# Patient Record
Sex: FEMALE | Race: WHITE | NOT HISPANIC OR LATINO | Employment: PART TIME | URBAN - METROPOLITAN AREA
[De-identification: names, ages, dates, MRNs, and addresses within clinical notes are randomized per-mention and may not be internally consistent; named-entity substitution may affect disease eponyms.]

---

## 2017-02-20 ENCOUNTER — HOSPITAL ENCOUNTER (OUTPATIENT)
Dept: MAMMOGRAPHY | Facility: HOSPITAL | Age: 47
Discharge: HOME | End: 2017-02-20
Attending: FAMILY MEDICINE | Admitting: FAMILY MEDICINE

## 2017-03-07 ENCOUNTER — HOSPITAL ENCOUNTER (OUTPATIENT)
Dept: MAMMOGRAPHY | Facility: HOSPITAL | Age: 47
Discharge: HOME | End: 2017-03-07
Attending: FAMILY MEDICINE | Admitting: FAMILY MEDICINE

## 2020-08-11 ENCOUNTER — HOSPITAL ENCOUNTER (EMERGENCY)
Facility: HOSPITAL | Age: 50
Discharge: HOME | End: 2020-08-11
Attending: EMERGENCY MEDICINE | Admitting: EMERGENCY MEDICINE

## 2020-08-11 VITALS
HEART RATE: 93 BPM | BODY MASS INDEX: 25.85 KG/M2 | SYSTOLIC BLOOD PRESSURE: 129 MMHG | HEIGHT: 70 IN | OXYGEN SATURATION: 97 % | DIASTOLIC BLOOD PRESSURE: 82 MMHG | TEMPERATURE: 100.1 F | WEIGHT: 180.56 LBS | RESPIRATION RATE: 20 BRPM

## 2020-08-11 DIAGNOSIS — K04.7 DENTAL INFECTION: Primary | ICD-10-CM

## 2020-08-11 PROCEDURE — 99283 EMERGENCY DEPT VISIT LOW MDM: CPT

## 2020-08-11 RX ORDER — METRONIDAZOLE 500 MG/1
500 TABLET ORAL 3 TIMES DAILY
Qty: 21 TABLET | Refills: 0 | Status: SHIPPED | OUTPATIENT
Start: 2020-08-11

## 2020-08-11 RX ORDER — PENICILLIN V POTASSIUM 500 MG/1
500 TABLET ORAL 4 TIMES DAILY
Qty: 28 TABLET | Refills: 0 | Status: SHIPPED | OUTPATIENT
Start: 2020-08-11

## 2020-08-11 RX ADMIN — LIDOCAINE HYDROCHLORIDE: 20 SOLUTION ORAL; TOPICAL at 15:50

## 2020-08-11 NOTE — ED PROVIDER NOTES
"Subjective   History of Present Illness  Mouth pain  50-year-old female complains of pain in her right lower dental region and gums over the last couple days.  She reports no fevers or chills or trauma.  Review of Systems   Constitutional: Negative.    HENT: Positive for dental problem.    Respiratory: Negative.    Cardiovascular: Negative.    Gastrointestinal: Negative.        History reviewed. No pertinent past medical history.    No Known Allergies    Past Surgical History:   Procedure Laterality Date   •  SECTION     • HYSTERECTOMY     • TONSILLECTOMY         History reviewed. No pertinent family history.    Social History     Socioeconomic History   • Marital status: Legally      Spouse name: Not on file   • Number of children: Not on file   • Years of education: Not on file   • Highest education level: Not on file   Tobacco Use   • Smoking status: Current Every Day Smoker     Types: Cigarettes   Substance and Sexual Activity   • Alcohol use: Not Currently   • Drug use: Not Currently   • Sexual activity: Defer     Prior to Admission medications    Not on File     /83 (BP Location: Left arm, Patient Position: Sitting)   Pulse 111   Temp 98.4 °F (36.9 °C) (Oral)   Resp 16   Ht 177.8 cm (70\")   Wt 81.9 kg (180 lb 8.9 oz)   SpO2 95%   BMI 25.91 kg/m²   I examined the patient using the appropriate personal protective equipment.          Objective   Physical Exam  General: Well-appearing, no acute distress  Psych: Oriented, pleasant affect  Respirations: Clear, nonlabored respirations  Skin: No rash, normal color  There is some slight soft tissue swelling over the right mid mandible and intraorally there is some gingival swelling in the region of the right lower molar region there is diffuse dental decay there is no purulent drainage there is no palpable area of fluctuance.  The right lower molar gingiva is tender to palpation there is no submandibular swelling or midline " swelling  Trachea midline  Procedures           ED Course                                           MDM  Patient advised the findings she is not septic.  There is no airway or deep tissue neck infection.  She is prescribed penicillin and Flagyl and was discharged good condition she was provided dental balls for analgesia.  She was advised to follow-up with a dentist and was given warning signs for return.  Final diagnoses:   Dental infection            Vik Jama MD  08/11/20 1531

## 2020-08-11 NOTE — ED NOTES
Provider notified of pt's elevated temperature, pt given prescription for 2 abx with discharge instructions. Pt to take those rx today along with tylenol and/or ibuprofen at home for fever reduction and pain. Pt voiced understanding.     Betsy Macias RN  08/11/20 7511

## 2020-08-11 NOTE — ED TRIAGE NOTES
Reports pain to rt jaw area that radiates to rt ear and scalp starting yesterday, denies any recent illness or cough, congestion. Reports mild tooth pain

## 2025-07-02 ENCOUNTER — HOSPITAL ENCOUNTER (EMERGENCY)
Facility: HOSPITAL | Age: 55
Discharge: HOME | End: 2025-07-02
Admitting: EMERGENCY MEDICINE

## 2025-07-02 VITALS
HEIGHT: 70 IN | BODY MASS INDEX: 27.11 KG/M2 | HEART RATE: 72 BPM | WEIGHT: 189.38 LBS | DIASTOLIC BLOOD PRESSURE: 76 MMHG | TEMPERATURE: 97.6 F | SYSTOLIC BLOOD PRESSURE: 123 MMHG | OXYGEN SATURATION: 96 % | RESPIRATION RATE: 19 BRPM

## 2025-07-02 DIAGNOSIS — R51.9 RIGHT SIDED FACIAL PAIN: Primary | ICD-10-CM

## 2025-07-02 DIAGNOSIS — H92.01 RIGHT EAR PAIN: ICD-10-CM

## 2025-07-02 PROCEDURE — 99282 EMERGENCY DEPT VISIT SF MDM: CPT

## 2025-07-02 RX ORDER — VALACYCLOVIR HYDROCHLORIDE 1 G/1
1000 TABLET, FILM COATED ORAL 3 TIMES DAILY
Qty: 21 TABLET | Refills: 0 | Status: SHIPPED | OUTPATIENT
Start: 2025-07-02 | End: 2025-07-09

## 2025-07-02 NOTE — ED PROVIDER NOTES
Subjective   History of Present Illness  Patient is a 55-year-old white female with no significant medical history presents today complaints of pain to the right side of her face.  She noticed it a few days ago.  She thought maybe it was due to feeling that had chipped off of a tooth in the right lower jaw.  She scheduled to see dentist later this month.  Due to ongoing symptoms and pain radiating into the right ear she thought she may have an ear infection and she presents to the ED for evaluation.  She denies any bleeding or drainage from the ear.  No fever or chills.  No difficulty breathing or swallowing.  She denies any chest pain or shortness of breath.      Review of Systems   Constitutional:  Negative for chills and fever.   HENT:  Positive for dental problem and ear pain.         Right-sided facial pain       No past medical history on file.    No Known Allergies    Past Surgical History:   Procedure Laterality Date     SECTION      HYSTERECTOMY      TONSILLECTOMY         No family history on file.    Social History     Socioeconomic History    Marital status: Legally    Tobacco Use    Smoking status: Every Day     Types: Cigarettes   Substance and Sexual Activity    Alcohol use: Not Currently    Drug use: Not Currently    Sexual activity: Defer           Objective   Physical Exam  Vital signs and triage nurse note reviewed.  Constitutional: Awake, alert; well-developed and well-nourished. No acute distress is noted.  HEENT: Normocephalic, atraumatic; pupils are PERRL with intact EOM; oropharynx is pink and moist without exudate or erythema.  No drooling or pooling of oral secretions.  Tooth in the right lower jaw but is noted to have some missing filling.  No surrounding redness swelling or fluctuance.  Externally there is no overlying erythema or warmth, no appreciable edema.  TMs are intact bilaterally and appear within normal limits.  There are a few red raised lesions noted over the  right side of the face and just behind the right ear concerning for shingles.  Neck: Supple  Cardiovascular: Regular rate and rhythm, normal S1-S2.  No murmur noted.  Pulmonary: Respiratory effort regular nonlabored, breath sounds clear to auscultation all fields.  Musculoskeletal: Independent range of motion of all extremities with no palpable tenderness or edema.  Skin: Flesh tone, warm, dry.     Procedures           ED Course                                                       Medical Decision Making  Patient presents today with the above complaint.    She had above exam and evaluation.  Findings were discussed with her.  She will be discharged home with prescription for Augmentin to cover for possible dental infection.  She is also discharged home with prescription for Valacyclovir given the presence of a couple of red raised lesions over the right side of the face in the dermatomal distribution.    Patient is instructed follow-up with her dentist or primary care provider but was given return precautions and she voiced understanding.    Risk  Prescription drug management.        Final diagnoses:   Right sided facial pain   Right ear pain       ED Disposition  ED Disposition       ED Disposition   Discharge    Condition   Stable    Comment   --               Your dentist          Jessica Dutton DO  [address]  [address]  [facility] [id number] [phone]               Medication List        New Prescriptions      amoxicillin-clavulanate 875-125 MG per tablet  Commonly known as: AUGMENTIN  Take 1 tablet by mouth 2 (Two) Times a Day for 7 days.     valACYclovir 1000 MG tablet  Commonly known as: VALTREX  Take 1 tablet by mouth 3 (Three) Times a Day for 7 days.            Stop      metroNIDAZOLE 500 MG tablet  Commonly known as: FLAGYL     penicillin v potassium 500 MG tablet  Commonly known as: VEETID               Where to Get Your Medications        These medications were sent to [facility]  [place]  [address] [place] [address] [place] [address]      Hours: Monday to Friday 8 AM to 6 PM Phone: [phone]   amoxicillin-clavulanate 875-125 MG per tablet  valACYclovir 1000 MG tablet            Rashida Erwin, NAM  07/02/25 1111

## 2025-07-02 NOTE — DISCHARGE INSTRUCTIONS
Take medication as prescribed.  Continue current medication.  Follow-up with your primary care provider and dentist.  Return to the ED for new or worsening symptoms.